# Patient Record
Sex: FEMALE | Race: BLACK OR AFRICAN AMERICAN | Employment: FULL TIME | URBAN - METROPOLITAN AREA
[De-identification: names, ages, dates, MRNs, and addresses within clinical notes are randomized per-mention and may not be internally consistent; named-entity substitution may affect disease eponyms.]

---

## 2020-08-28 PROBLEM — A74.9 CHLAMYDIA INFECTION AFFECTING PREGNANCY, ANTEPARTUM: Status: ACTIVE | Noted: 2020-08-28

## 2020-08-28 PROBLEM — O98.819 CHLAMYDIA INFECTION AFFECTING PREGNANCY, ANTEPARTUM: Status: ACTIVE | Noted: 2020-08-28

## 2020-08-28 PROBLEM — Z34.80 SUPERVISION OF OTHER NORMAL PREGNANCY, ANTEPARTUM: Status: ACTIVE | Noted: 2020-08-28

## 2020-11-04 PROBLEM — O99.013 ANEMIA, ANTEPARTUM, THIRD TRIMESTER: Status: ACTIVE | Noted: 2020-11-04

## 2021-01-02 ENCOUNTER — HOSPITAL ENCOUNTER (INPATIENT)
Age: 26
LOS: 2 days | Discharge: HOME | End: 2021-01-04
Attending: OBSTETRICS & GYNECOLOGY | Admitting: OBSTETRICS & GYNECOLOGY

## 2021-01-02 PROBLEM — O47.9 UTERINE CONTRACTIONS DURING PREGNANCY: Status: ACTIVE | Noted: 2021-01-02

## 2021-01-02 PROBLEM — Z37.9 NORMAL LABOR: Status: ACTIVE | Noted: 2021-01-02

## 2021-01-02 LAB
ABO + RH BLD: NORMAL
ARTERIAL PATENCY WRIST A: ABNORMAL
ARTERIAL PATENCY WRIST A: ABNORMAL
BASE DEFICIT BLD-SCNC: 3 MMOL/L
BASE DEFICIT BLD-SCNC: 3 MMOL/L
BDY SITE: ABNORMAL
BDY SITE: ABNORMAL
BLOOD GROUP ANTIBODIES SERPL: NORMAL
CO2 BLD-SCNC: 25 MMOL/L
CO2 BLD-SCNC: 26 MMOL/L
COLLECT TIME,HTIME: 58
COLLECT TIME,HTIME: 58
ERYTHROCYTE [DISTWIDTH] IN BLOOD BY AUTOMATED COUNT: 13.2 % (ref 11.9–14.6)
GAS FLOW.O2 O2 DELIVERY SYS: ABNORMAL L/MIN
GAS FLOW.O2 O2 DELIVERY SYS: ABNORMAL L/MIN
HCO3 BLD-SCNC: 24.2 MMOL/L (ref 22–26)
HCO3 BLDV-SCNC: 23.2 MMOL/L (ref 23–28)
HCT VFR BLD AUTO: 30.7 % (ref 35.8–46.3)
HGB BLD-MCNC: 9.9 G/DL (ref 11.7–15.4)
MCH RBC QN AUTO: 28.3 PG (ref 26.1–32.9)
MCHC RBC AUTO-ENTMCNC: 32.2 G/DL (ref 31.4–35)
MCV RBC AUTO: 87.7 FL (ref 79.6–97.8)
NRBC # BLD: 0 K/UL (ref 0–0.2)
PCO2 BLDCO: 46 MMHG (ref 32–68)
PCO2 BLDCO: 53 MMHG (ref 32–68)
PH BLDCO: 7.27 [PH] (ref 7.15–7.38)
PH BLDCO: 7.31 [PH] (ref 7.15–7.38)
PLATELET # BLD AUTO: 268 K/UL (ref 150–450)
PMV BLD AUTO: 10.4 FL (ref 9.4–12.3)
PO2 BLDCO: 16 MMHG
PO2 BLDCO: 9 MMHG
RBC # BLD AUTO: 3.5 M/UL (ref 4.05–5.2)
SAO2 % BLD: 7 % (ref 95–98)
SAO2 % BLDV: 18 % (ref 65–88)
SERVICE CMNT-IMP: ABNORMAL
SERVICE CMNT-IMP: ABNORMAL
SPECIMEN EXP DATE BLD: NORMAL
SPECIMEN TYPE: ABNORMAL
SPECIMEN TYPE: ABNORMAL
WBC # BLD AUTO: 15.9 K/UL (ref 4.3–11.1)

## 2021-01-02 PROCEDURE — 85027 COMPLETE CBC AUTOMATED: CPT

## 2021-01-02 PROCEDURE — 2709999900 HC NON-CHARGEABLE SUPPLY

## 2021-01-02 PROCEDURE — 74011250637 HC RX REV CODE- 250/637: Performed by: OBSTETRICS & GYNECOLOGY

## 2021-01-02 PROCEDURE — 75410000000 HC DELIVERY VAGINAL/SINGLE

## 2021-01-02 PROCEDURE — 75410000002 HC LABOR FEE PER 1 HR

## 2021-01-02 PROCEDURE — 82803 BLOOD GASES ANY COMBINATION: CPT

## 2021-01-02 PROCEDURE — 74011250636 HC RX REV CODE- 250/636

## 2021-01-02 PROCEDURE — 99284 EMERGENCY DEPT VISIT MOD MDM: CPT

## 2021-01-02 PROCEDURE — 74011000250 HC RX REV CODE- 250: Performed by: OBSTETRICS & GYNECOLOGY

## 2021-01-02 PROCEDURE — 86901 BLOOD TYPING SEROLOGIC RH(D): CPT

## 2021-01-02 PROCEDURE — 74011250636 HC RX REV CODE- 250/636: Performed by: OBSTETRICS & GYNECOLOGY

## 2021-01-02 PROCEDURE — 75410000003 HC RECOV DEL/VAG/CSECN EA 0.5 HR

## 2021-01-02 PROCEDURE — 0KQM0ZZ REPAIR PERINEUM MUSCLE, OPEN APPROACH: ICD-10-PCS | Performed by: OBSTETRICS & GYNECOLOGY

## 2021-01-02 PROCEDURE — 76815 OB US LIMITED FETUS(S): CPT

## 2021-01-02 PROCEDURE — 65270000029 HC RM PRIVATE

## 2021-01-02 RX ORDER — IBUPROFEN 800 MG/1
800 TABLET ORAL
Status: DISCONTINUED | OUTPATIENT
Start: 2021-01-02 | End: 2021-01-04 | Stop reason: HOSPADM

## 2021-01-02 RX ORDER — OXYCODONE HYDROCHLORIDE 5 MG/1
5 TABLET ORAL
Status: DISCONTINUED | OUTPATIENT
Start: 2021-01-02 | End: 2021-01-04 | Stop reason: HOSPADM

## 2021-01-02 RX ORDER — NALOXONE HYDROCHLORIDE 0.4 MG/ML
0.4 INJECTION, SOLUTION INTRAMUSCULAR; INTRAVENOUS; SUBCUTANEOUS AS NEEDED
Status: DISCONTINUED | OUTPATIENT
Start: 2021-01-02 | End: 2021-01-04 | Stop reason: HOSPADM

## 2021-01-02 RX ORDER — ZOLPIDEM TARTRATE 5 MG/1
5 TABLET ORAL
Status: DISCONTINUED | OUTPATIENT
Start: 2021-01-02 | End: 2021-01-04 | Stop reason: HOSPADM

## 2021-01-02 RX ORDER — LIDOCAINE HYDROCHLORIDE 20 MG/ML
JELLY TOPICAL
Status: DISCONTINUED | OUTPATIENT
Start: 2021-01-02 | End: 2021-01-02 | Stop reason: HOSPADM

## 2021-01-02 RX ORDER — LOPERAMIDE HYDROCHLORIDE 2 MG/1
2 CAPSULE ORAL
Status: DISCONTINUED | OUTPATIENT
Start: 2021-01-02 | End: 2021-01-04 | Stop reason: HOSPADM

## 2021-01-02 RX ORDER — IBUPROFEN 800 MG/1
800 TABLET ORAL
Qty: 60 TAB | Refills: 2 | Status: SHIPPED | OUTPATIENT
Start: 2021-01-02 | End: 2021-02-12

## 2021-01-02 RX ORDER — OXYTOCIN 10 [USP'U]/ML
10 INJECTION, SOLUTION INTRAMUSCULAR; INTRAVENOUS ONCE
Status: COMPLETED | OUTPATIENT
Start: 2021-01-02 | End: 2021-01-02

## 2021-01-02 RX ORDER — SODIUM CHLORIDE 0.9 % (FLUSH) 0.9 %
5-40 SYRINGE (ML) INJECTION EVERY 8 HOURS
Status: DISCONTINUED | OUTPATIENT
Start: 2021-01-02 | End: 2021-01-02 | Stop reason: HOSPADM

## 2021-01-02 RX ORDER — BUTORPHANOL TARTRATE 2 MG/ML
1 INJECTION INTRAMUSCULAR; INTRAVENOUS
Status: DISCONTINUED | OUTPATIENT
Start: 2021-01-02 | End: 2021-01-02 | Stop reason: HOSPADM

## 2021-01-02 RX ORDER — DIPHENHYDRAMINE HCL 25 MG
25 CAPSULE ORAL
Status: DISCONTINUED | OUTPATIENT
Start: 2021-01-02 | End: 2021-01-04 | Stop reason: HOSPADM

## 2021-01-02 RX ORDER — DOCUSATE SODIUM 100 MG/1
100 CAPSULE, LIQUID FILLED ORAL
Status: DISCONTINUED | OUTPATIENT
Start: 2021-01-02 | End: 2021-01-04 | Stop reason: HOSPADM

## 2021-01-02 RX ORDER — SIMETHICONE 80 MG
80 TABLET,CHEWABLE ORAL
Status: DISCONTINUED | OUTPATIENT
Start: 2021-01-02 | End: 2021-01-04 | Stop reason: HOSPADM

## 2021-01-02 RX ORDER — ONDANSETRON 4 MG/1
4 TABLET, ORALLY DISINTEGRATING ORAL
Status: ACTIVE | OUTPATIENT
Start: 2021-01-02 | End: 2021-01-03

## 2021-01-02 RX ORDER — OXYTOCIN/RINGER'S LACTATE 30/500 ML
87.3 PLASTIC BAG, INJECTION (ML) INTRAVENOUS AS NEEDED
Status: DISCONTINUED | OUTPATIENT
Start: 2021-01-02 | End: 2021-01-04 | Stop reason: HOSPADM

## 2021-01-02 RX ORDER — OXYTOCIN/RINGER'S LACTATE 30/500 ML
10 PLASTIC BAG, INJECTION (ML) INTRAVENOUS AS NEEDED
Status: DISCONTINUED | OUTPATIENT
Start: 2021-01-02 | End: 2021-01-04 | Stop reason: HOSPADM

## 2021-01-02 RX ORDER — LIDOCAINE HYDROCHLORIDE 10 MG/ML
1 INJECTION INFILTRATION; PERINEURAL
Status: COMPLETED | OUTPATIENT
Start: 2021-01-02 | End: 2021-01-02

## 2021-01-02 RX ORDER — OXYTOCIN/RINGER'S LACTATE 30/500 ML
87.3 PLASTIC BAG, INJECTION (ML) INTRAVENOUS AS NEEDED
Status: COMPLETED | OUTPATIENT
Start: 2021-01-02 | End: 2021-01-02

## 2021-01-02 RX ORDER — MINERAL OIL
120 OIL (ML) ORAL
Status: DISCONTINUED | OUTPATIENT
Start: 2021-01-02 | End: 2021-01-02 | Stop reason: HOSPADM

## 2021-01-02 RX ORDER — SODIUM CHLORIDE 0.9 % (FLUSH) 0.9 %
5-40 SYRINGE (ML) INJECTION AS NEEDED
Status: DISCONTINUED | OUTPATIENT
Start: 2021-01-02 | End: 2021-01-02 | Stop reason: HOSPADM

## 2021-01-02 RX ORDER — OXYCODONE HYDROCHLORIDE 5 MG/1
10 TABLET ORAL
Status: DISCONTINUED | OUTPATIENT
Start: 2021-01-02 | End: 2021-01-04 | Stop reason: HOSPADM

## 2021-01-02 RX ORDER — DEXTROSE, SODIUM CHLORIDE, SODIUM LACTATE, POTASSIUM CHLORIDE, AND CALCIUM CHLORIDE 5; .6; .31; .03; .02 G/100ML; G/100ML; G/100ML; G/100ML; G/100ML
125 INJECTION, SOLUTION INTRAVENOUS CONTINUOUS
Status: DISCONTINUED | OUTPATIENT
Start: 2021-01-02 | End: 2021-01-02 | Stop reason: HOSPADM

## 2021-01-02 RX ORDER — OXYTOCIN/RINGER'S LACTATE 30/500 ML
10 PLASTIC BAG, INJECTION (ML) INTRAVENOUS AS NEEDED
Status: COMPLETED | OUTPATIENT
Start: 2021-01-02 | End: 2021-01-02

## 2021-01-02 RX ORDER — OXYTOCIN 10 [USP'U]/ML
INJECTION, SOLUTION INTRAMUSCULAR; INTRAVENOUS
Status: COMPLETED
Start: 2021-01-02 | End: 2021-01-02

## 2021-01-02 RX ADMIN — IBUPROFEN 800 MG: 800 TABLET ORAL at 06:09

## 2021-01-02 RX ADMIN — LIDOCAINE HYDROCHLORIDE 0.1 ML: 10 INJECTION, SOLUTION INFILTRATION; PERINEURAL at 01:31

## 2021-01-02 RX ADMIN — SODIUM CHLORIDE, SODIUM LACTATE, POTASSIUM CHLORIDE, AND CALCIUM CHLORIDE 1000 ML: 600; 310; 30; 20 INJECTION, SOLUTION INTRAVENOUS at 01:20

## 2021-01-02 RX ADMIN — OXYTOCIN 10 UNITS: 10 INJECTION INTRAVENOUS at 01:05

## 2021-01-02 RX ADMIN — OXYTOCIN 10 UNITS: 10 INJECTION, SOLUTION INTRAMUSCULAR; INTRAVENOUS at 01:05

## 2021-01-02 RX ADMIN — Medication 1 SPRAY: at 06:09

## 2021-01-02 RX ADMIN — IBUPROFEN 800 MG: 800 TABLET ORAL at 23:28

## 2021-01-02 RX ADMIN — WITCH HAZEL 1 PAD: 500 SOLUTION RECTAL; TOPICAL at 06:09

## 2021-01-02 RX ADMIN — Medication 10000 MILLI-UNITS: at 01:50

## 2021-01-02 RX ADMIN — Medication 87.3 MILLI-UNITS/MIN: at 02:10

## 2021-01-02 RX ADMIN — DOCUSATE SODIUM 100 MG: 100 CAPSULE ORAL at 16:47

## 2021-01-02 RX ADMIN — IBUPROFEN 800 MG: 800 TABLET ORAL at 16:47

## 2021-01-02 NOTE — PROGRESS NOTES
Admission assessment complete as noted. Patient oriented to room and unit. Plan of care reviewed and patient verbalizes understanding. Patient up to bathroom with RN assistance. Patient voided 400mL of clear, yellow urine. Nusrat-care taught and completed. Questions encouraged and answered. Patent encouraged to call for needs or concerns. Safety Teaching reviewed:   1. Hand hygiene prior to handling the infant. 2. Use of bulb syringe. 3. Bracelets with matching numbers are placed on mother and infant  4. An infant security tag  [facility]) is placed on the infant's ankle and monitored  5. All OB nurses wear pink Employee badges - do not give your baby to anyone without proper identification. 6. Never leave the baby alone in the room. 7. The infant should be placed on their back to sleep. on a firm mattress. No toys should be placed in the crib. (safe sleep video offered to view)  8. Never shake the baby (video offered to view)  9. Infant fall prevention - do not sleep with the baby, and place the baby in the crib while ambulating. 8. Mother and Baby Care booklet given to Mother.

## 2021-01-02 NOTE — PROGRESS NOTES
Results for Justine Holiday" (MRN [id number]) as of 1/2/2021 01:25   Ref.  Range 1/2/2021 01:17 1/2/2021 01:21   pCO2 cord blood Latest Ref Range: 32 - 68 mmHg 53 46   pO2 cord blood Latest Units: mmHg 9 16   HCO3 (POC) Latest Ref Range: 22 - 26 MMOL/L 24.2    sO2 (POC) Latest Ref Range: 95 - 98 % 7 (L)    Base deficit (POC) Latest Units: mmol/L 3 3   Specimen type (POC) Latest Units:   ARTERIAL CORD VENOUS CORD   pH, cord blood (POC) Latest Ref Range: 7.15 - 7.38   7.27 7.31   HCO3, venous (POC) Latest Ref Range: 23 - 28 MMOL/L  23.2   sO2, venous (POC) Latest Ref Range: 65 - 88 %  18 (L)   Site Latest Units:   CORD CORD   Device: Latest Units:   ROOM AIR ROOM AIR   Allens test (POC) Latest Units:   NOT APPLICABLE NOT APPLICABLE

## 2021-01-02 NOTE — L&D DELIVERY NOTE
Patient progressed rapidly to delivery before iv access was able to be established. Delivery in 3 pushes of vfi over 2nd degree lac with vaginal extension. Terminal meconium with odor to amniotic fluid. Infant vigorous at birth. Dried, suctioned, then cord clamped and cut and taken to warmer attended by nicu. Placenta delivered spont/intact. Cervix intact. Minimal bleeding. Dr. Peter Eldridge in room. Repaired laceration in usual fashion. Mom and baby stable. Ebl 300 and apgars 8/9    I was immediately available for this delivery, scrubbed and performed the repair. Uterus was firm to bimanual massage and IM pitocin.   -Vasyl Gomez MD FACOG    Delivery Summary    Patient: Mando Dick MRN: [id number]  SSN: [id number]    YOB: 1995  Age: 31 y.o.   Sex: female       Information for the patient's :  Jo Ann Serrano [[id number]]       Labor Events:    Labor: No    Steroids: None   Cervical Ripening Date/Time:       Cervical Ripening Type: None   Antibiotics During Labor: No   Rupture Identifier:      Rupture Date/Time:       Rupture Type: SROM   Amniotic Fluid Volume:      Amniotic Fluid Description: Unable to determine    Amniotic Fluid Odor: Foul    Induction: None       Induction Date/Time:        Indications for Induction:      Augmentation: None   Augmentation Date/Time:      Indications for Augmentation:     Labor complications: None       Additional complications:        Delivery Events:  Indications For Episiotomy:     Episiotomy: None   Perineal Laceration(s): 2nd   Repaired: Yes   Periurethral Laceration Location: right    Repaired: No    Labial Laceration Location:     Repaired:     Sulcal Laceration Location:     Repaired:     Vaginal Laceration Location:     Repaired:     Cervical Laceration Location:     Repaired:     Repair Suture: Vicryl 3-0;Vicryl 2-0   Number of Repair Packets: 2   Estimated Blood Loss (ml): 300ml   Quantitative Blood Loss (ml) Delivery Date: 2021    Delivery Time: 12:58 AM  Delivery Type: Vaginal, Spontaneous  Sex:  Female    Gestational Age: 36w4d   Delivery Clinician:  Rimma Angel  Living Status: Living   Delivery Location: L&D OB ED/triage room           APGARS  One minute Five minutes Ten minutes   Skin color:            Heart rate:            Grimace:            Muscle tone:            Breathing: Totals:                Presentation: Vertex    Position:        Resuscitation Method:  Tactile Stimulation     Meconium Stained: Terminal      Cord Information: 3 Vessels  Complications: None  Cord around:    Delayed cord clamping? Yes  Cord clamped date/time:   Disposition of Cord Blood: Lab    Blood Gases Sent?: Yes    Placenta:  Date/Time: 2021  1:03 AM  Removal: Spontaneous      Appearance: Normal;Intact      Measurements:  Birth Weight:        Birth Length:        Head Circumference:        Chest Circumference:       Abdominal Girth: Other Providers:   Tami LEE;JESSIE CORADO;;LORI NESBITT;;ALESSIA WISEMAN;JUN CARREON;JERMANIE CHRISTINE, Obstetrician;Primary Nurse;Primary [place] Nurse;Scrub Tech;Neonatologist;Respiratory Therapist;In-House Ob; Charge Nurse           Group B Strep: No results found for: ДМИТРИЙ White  Information for the patient's :  Neelam Ca [[id number]]   No results found for: ABORH, PCTABR, PCTDIG, BILI, ABORHEXT, ABORH     Recent Labs     21  0121 21  0117   PCO2CB 46 53   PO2CB 16 9   HCO3I  --  24.2   SO2I  --  7*   IBD 3 3   SPECTI VENOUS CORD ARTERIAL CORD   PHICB 7.31 7.27   ISITE CORD CORD   IDEV ROOM AIR ROOM AIR   IALLEN NOT APPLICABLE NOT APPLICABLE

## 2021-01-02 NOTE — ROUTINE PROCESS
report received from [facility] [place], RN. Patient care assumed.

## 2021-01-02 NOTE — PROGRESS NOTES
Patient presents to OBED1 via EMS with c/o contractions every 3 minutes. Denies leaking of fluid, vaginal bleeding, or urge to push. EFM initiated and VS checked.

## 2021-01-02 NOTE — H&P
History & Physical    Name: Reva Flores MRN: [id number]  SSN: [id number]    YOB: 1995  Age: 31 y.o. Sex: female      Chief Complaint   Patient presents with    Contractions       Subjective:     Estimated Date of Delivery: 21  OB History    Para Term  AB Living   2       1     SAB TAB Ectopic Molar Multiple Live Births   1                # Outcome Date GA Lbr Gary/2nd Weight Sex Delivery Anes PTL Lv   2 Current            1 2019 12w0d             Birth Comments: D&C       Ms. Neredia Irene is seen with pregnancy at 38w2d for active labor. Patient states she has been priscilla since 12 noon with leaking of fluid. Prenatal course was complicated by chlamydia. the patients states that the baby moves as usual   Please see prenatal records for details. Past Medical History:   Diagnosis Date    Anemia, antepartum, third trimester 2020    Chlamydia infection affecting pregnancy, antepartum 2020     Past Surgical History:   Procedure Laterality Date    HX DILATION AND CURETTAGE       Social History     Occupational History    Occupation: [employer]   Tobacco Use    Smoking status: Never Smoker    Smokeless tobacco: Never Used   Substance and Sexual Activity    Alcohol use: Not Currently     Frequency: Never    Drug use: Never    Sexual activity: Yes     Partners: Male     Birth control/protection: None     Family History   Problem Relation Age of Onset    Diabetes Maternal Grandmother     Hypertension Maternal Grandmother        Allergies   Allergen Reactions    Pcn [Penicillins] Unknown (comments)     Was told she was as a child     Prior to Admission medications    Medication Sig Start Date End Date Taking? Authorizing Provider   metroNIDAZOLE (FlagyL) 500 mg tablet Take 4 pills tonight orally then take one pill po bid for 7 days 20   Lyndon Dyer MD   PNV No.40-Iron Fum-FA Cmb No.1 27-1 mg tab Take  by mouth.     Provider, Historical Review of Systems:  Constitutional:No headache, fever  Cardiac:   No chest pain      Resp: No cough or shortness of breath     GI:   No nausea/vomiting, diarrhea, abdominal pain    :   No dysuria  Neuro:     No vision changes, headache      Objective:     Vitals:  Vitals:    21 0019   BP: 134/76   Pulse: (!) 108   Resp: 20   Temp: 99.4 °F (37.4 °C)        Physical Exam:  Patient without distress. Heart: Regular rate and rhythm  Lung: clear to auscultation throughout lung fields, no wheezes, no rales, no rhonchi and normal respiratory effort  Back: costovertebral angle tenderness absent  Abdomen: soft, nontender, without guarding, without rebound  Fundus: soft and non tender  Cervical Exam: 9 cm dilated    100% effaced    0 station  Bloody show  Presenting Part: cephalic, hair palpable  Lower Extremities:  - Edema 1+  Membranes:  Spontaneous Rupture of Membranes; Amniotic Fluid: unsure fluid  Fetal Heart Rate tracing: baseline variable but only tracing for a short time. Possible low baseline with accels or late decelerations  Uterine contractions: regular, every 2 minutes    Prenatal Labs:   Lab Results   Component Value Date/Time    Rubella, External Immune 2020    HBsAg, External Negative 2020    HIV, External Negative 2020    RPR, External Negative 2020    Gonorrhea, External Negative 2020    Chlamydia, External positive 2020         Assessment/Plan:     Ms. Yara Pelayo is a  seen with pregnancy at 38w2d for active labor. Cat 2 tracing. Plan:     Admit for labor management, fluid resuscitation, labs, o2 and positioning. Monitor response of tracing closely.     Patient discussed with Dr. Luis Arrieta By:  Fortino Campbell MD     2021

## 2021-01-02 NOTE — PROGRESS NOTES
SBAR OUT Report: Mother    Verbal report given to Vivian Patel RN (full name & credentials) on this patient, who is now being transferred to MIU (unit) for routine progression of care. The patient is not wearing a green \"Anesthesia-Duramorph\" band. Report consisted of patient's Situation, Background, Assessment and Recommendations (SBAR).  ID bands were compared with the identification form, and verified with the patient and receiving nurse. Information from the SBAR, Intake/Output and MAR and the Sigrid Report was reviewed with the receiving nurse; opportunity for questions and clarification provided.     Fundal assessment performed with oncoming RN

## 2021-01-02 NOTE — PROGRESS NOTES
SBAR IN Report: Mother    Verbal report received from Dae Moran RN on this patient, who is now being transferred from L&D for routine progression of care. The patient is not wearing a green \"Anesthesia-Duramorph\" band. Report consisted of patient's Situation, Background, Assessment and Recommendations (SBAR). [place] ID bands were compared with the identification form, and verified with the patient and transferring nurse. Information from the SBAR, Kardex, Procedure Summary, Intake/Output, MAR and Recent Results and the Sigrid Report was reviewed with the transferring nurse; opportunity for questions and clarification provided.

## 2021-01-03 PROCEDURE — 2709999900 HC NON-CHARGEABLE SUPPLY

## 2021-01-03 PROCEDURE — 65270000029 HC RM PRIVATE

## 2021-01-03 PROCEDURE — 74011250637 HC RX REV CODE- 250/637: Performed by: OBSTETRICS & GYNECOLOGY

## 2021-01-03 RX ADMIN — IBUPROFEN 800 MG: 800 TABLET ORAL at 05:35

## 2021-01-03 RX ADMIN — IBUPROFEN 800 MG: 800 TABLET ORAL at 12:53

## 2021-01-03 RX ADMIN — DOCUSATE SODIUM 100 MG: 100 CAPSULE ORAL at 12:53

## 2021-01-03 RX ADMIN — IBUPROFEN 800 MG: 800 TABLET ORAL at 23:40

## 2021-01-03 RX ADMIN — IBUPROFEN 800 MG: 800 TABLET ORAL at 18:16

## 2021-01-03 RX ADMIN — DOCUSATE SODIUM 100 MG: 100 CAPSULE ORAL at 18:16

## 2021-01-03 NOTE — PROGRESS NOTES
Post-Partum Day Number 0 Progress Note    Patient doing well post-partum without significant complaint. Voiding without difficulty, normal lochia. Denies pain. Vitals:    Patient Vitals for the past 8 hrs:   BP Temp Pulse Resp SpO2   21 (!) 101/55 97.8 °F (36.6 °C) 83 18 96 %     Temp (24hrs), Av.4 °F (36.9 °C), Min:97.8 °F (36.6 °C), Max:99.4 °F (37.4 °C)      Vital signs stable, afebrile. Exam:  Patient without distress. Abdomen soft, fundus firm at level of umbilicus, nontender               Perineum with normal lochia noted. Lower extremities are negative for swelling, cords or tenderness. Lab/Data Review:  CBC:   Lab Results   Component Value Date/Time    WBC 15.9 (H) 2021 05:27 AM    HGB 9.9 (L) 2021 05:27 AM    HCT 30.7 (L) 2021 05:27 AM     2021 05:27 AM       Assessment and Plan:  Patient appears to be having uncomplicated post-partum course. Continue routine perineal care and maternal education. Plan discharge tomorrow if no problems occur - pt delivered precipitously this morning but confirmed if baby is approved for discharge tomorrow, she would prefer to go as well. Reviewed with her that we can always hold the order if issues arise.

## 2021-01-03 NOTE — PROGRESS NOTES
Post-Partum Day Number 1 Progress Note    Patient doing well post-partum without significant complaint. Voiding without difficulty, normal lochia. Vitals:    Patient Vitals for the past 8 hrs:   BP Temp Pulse Resp SpO2   21 1543 98/61 98.3 °F (36.8 °C) 78 17 97 %     Temp (24hrs), Av.9 °F (36.6 °C), Min:97.7 °F (36.5 °C), Max:98.3 °F (36.8 °C)      Vital signs stable, afebrile. Exam:  Patient without distress. Abdomen soft, fundus firm at level of umbilicus, nontender               Lower extremities are negative for swelling, cords or tenderness. Lab/Data Review: All lab results for the last 24 hours reviewed. Assessment and Plan:  Patient appears to be having uncomplicated post-partum course. Continue routine perineal care and maternal education. Desires to stay tonight, thus plan discharge tomorrow if no problems occur.

## 2021-01-04 VITALS
TEMPERATURE: 97.5 F | SYSTOLIC BLOOD PRESSURE: 101 MMHG | DIASTOLIC BLOOD PRESSURE: 55 MMHG | OXYGEN SATURATION: 97 % | HEART RATE: 66 BPM | RESPIRATION RATE: 18 BRPM

## 2021-01-04 PROCEDURE — 2709999900 HC NON-CHARGEABLE SUPPLY

## 2021-01-04 PROCEDURE — 74011250637 HC RX REV CODE- 250/637: Performed by: OBSTETRICS & GYNECOLOGY

## 2021-01-04 RX ADMIN — IBUPROFEN 800 MG: 800 TABLET ORAL at 06:07

## 2021-01-04 NOTE — PROGRESS NOTES
Chart reviewed - first time parent. SW met with patient while social distancing w/mask     provided education on [facility] Postpartum  Home Visit. At this time, [facility] is completing this  home visit telephonically due to social distancing. Family would like to participate in program.  Referral will be made at discharge. Patient given informational packet on  mood & anxiety disorders (resources/education). No PCP - referral made to [facility] PCP Coordinator. Patient confirms that insurance is [employer] (copy of card scanned into chart on 10/01/20). Family denies any additional needs from  at this time. Family has 's contact information should any needs/questions arise.     NAKUL Keith-DOYLE  [facility]

## 2021-01-04 NOTE — PROGRESS NOTES
Patient is S/P vaginal delivery at 38 2/7 weeks, labor. No complaints today. Lochia < menses. No GI/ issues. No F/C. VITALS  Patient Vitals for the past 24 hrs:   Temp Pulse Resp BP SpO2   21 0718 97.5 °F (36.4 °C) 66 18 (!) 101/55 97 %   21 1847 98.3 °F (36.8 °C) 75 16 106/70 98 %   21 1543 98.3 °F (36.8 °C) 78 17 98/61 97 %        CV - RRR  LUNGS - CTA bilaterally  ABD - soft, approp tend, FF below umbilicus  EXT - tr edema bilaterally          Labs:  No results found for this or any previous visit (from the past 24 hour(s)). PPD #2      Pt is bottle feeding. No issues or complaints today  Stable.   Routine PP instructions      Sarah Damon MD  9:03 AM  21

## 2021-01-04 NOTE — PROGRESS NOTES
Referral made to [facility]  home visit program.    Taj Potter

## 2021-01-04 NOTE — PROGRESS NOTES
Problem: Patient Education: Go to Patient Education Activity  Goal: Patient/Family Education  Outcome: Resolved/Met     Problem: Vaginal Delivery: Postpartum 2  Goal: Off Pathway (Use only if patient is Off Pathway)  Outcome: Resolved/Met  Goal: Activity/Safety  Outcome: Resolved/Met  Goal: Consults, if ordered  Outcome: Resolved/Met  Goal: Nutrition/Diet  Outcome: Resolved/Met  Goal: Discharge Planning  Outcome: Resolved/Met  Goal: Medications  Outcome: Resolved/Met  Goal: Treatments/Interventions/Procedures  Outcome: Resolved/Met  Goal: Psychosocial  Outcome: Resolved/Met     Problem: Vaginal Delivery: Discharge Outcomes  Goal: *Verbalizes name, dosage, time, side effects, and number of days to continue medications  Outcome: Resolved/Met  Goal: *Describes available resources and support systems  Outcome: Resolved/Met  Goal: *No signs and symptoms of infection  Outcome: Resolved/Met  Goal: *Birth certificate information completed  Outcome: Resolved/Met  Goal: *Received and verbalizes understanding of discharge plan and instructions  Outcome: Resolved/Met  Goal: *Vital signs within defined limits  Outcome: Resolved/Met  Goal: *Labs within defined limits  Outcome: Resolved/Met  Goal: *Hemodynamically stable  Outcome: Resolved/Met  Goal: *Optimal pain control at patient's stated goal  Outcome: Resolved/Met  Goal: *Participates in infant care  Outcome: Resolved/Met  Goal: *Demonstrates progressive activity  Outcome: Resolved/Met  Goal: *Appropriate parent-infant bonding  Outcome: Resolved/Met  Goal: *Tolerating diet  Outcome: Resolved/Met     Problem: Falls - Risk of  Goal: *Absence of Falls  Description: Document Mindy Fall Risk and appropriate interventions in the flowsheet.   Outcome: Resolved/Met  Note: Fall Risk Interventions:            Medication Interventions: Teach patient to arise slowly                   Problem: Patient Education: Go to Patient Education Activity  Goal: Patient/Family Education  Outcome: Resolved/Met

## 2021-01-04 NOTE — DISCHARGE INSTRUCTIONS
Patient Education        After Your Delivery (the Postpartum Period): Care Instructions  Your Care Instructions     Congratulations on the birth of your baby. Like pregnancy, the  period can be a time of excitement, pascual, and exhaustion. You may look at your wondrous little baby and feel happy. You may also be overwhelmed by your new sleep hours and new responsibilities. At first, babies often sleep during the days and are awake at night. They do not have a pattern or routine. They may make sudden gasps, jerk themselves awake, or look like they have crossed eyes. These are all normal, and they may even make you smile. In these first weeks after delivery, try to take good care of yourself. It may take 4 to 6 weeks to feel like yourself again, and possibly longer if you had a  birth. You will likely feel very tired for several weeks. Your days will be full of ups and downs, but lots of pascual as well. Follow-up care is a key part of your treatment and safety. Be sure to make and go to all appointments, and call your doctor if you are having problems. It's also a good idea to know your test results and keep a list of the medicines you take. How can you care for yourself at home? Take care of your body after delivery  · Use pads instead of tampons for the bloody flow that may last as long as 2 weeks. · Ease cramps with ibuprofen (Advil, Motrin). · Ease soreness of hemorrhoids and the area between your vagina and rectum with ice compresses or witch hazel pads. · Ease constipation by drinking lots of fluid and eating high-fiber foods. Ask your doctor about over-the-counter stool softeners. · Cleanse yourself with a gentle squeeze of warm water from a bottle instead of wiping with toilet paper. · Take a sitz bath in warm water several times a day. · Wear a good nursing bra. Ease sore and swollen breasts with warm, wet washcloths.   · If you are not breastfeeding, use ice rather than heat for breast soreness. · Your period may not start for several months if you are breastfeeding. You may bleed more, and longer at first, than you did before you got pregnant. · Wait until you are healed (about 4 to 6 weeks) before you have sexual intercourse. Your doctor will tell you when it is okay to have sex. · Try not to travel with your baby for 5 or 6 weeks. If you take a long car trip, make frequent stops to walk around and stretch. Avoid exhaustion  · Rest every day. Try to nap when your baby naps. · Ask another adult to be with you for a few days after delivery. · Plan for  if you have other children. · Stay flexible so you can eat at odd hours and sleep when you need to. Both you and your baby are making new schedules. · Plan small trips to get out of the house. Change can make you feel less tired. · Ask for help with housework, cooking, and shopping. Remind yourself that your job is to care for your baby. Know about help for postpartum depression  · \"Baby blues\" are common for the first 1 to 2 weeks after birth. You may cry or feel sad or irritable for no reason. · Rest whenever you can. Being tired makes it harder to handle your emotions. · Go for walks with your baby. · Talk to your partner, friends, and family about your feelings. · If your symptoms last for more than a few weeks, or if you feel very depressed, ask your doctor for help. · Postpartum depression can be treated. Support groups and counseling can help. Sometimes medicine can also help. Stay healthy  · Eat healthy foods so you have more energy and lose extra baby pounds. · If you breastfeed, avoid drugs. If you quit smoking during pregnancy, try to stay smoke-free. If you choose to have a drink now and then, have only one drink, and limit the number of occasions that you have a drink. Wait to breastfeed at least 2 hours after you have a drink to reduce the amount of alcohol the baby may get in the milk.   · Start daily exercise after 4 to 6 weeks, but rest when you feel tired. · Learn exercises to tone your belly. Do Kegel exercises to regain strength in your pelvic muscles. You can do these exercises while you stand or sit. ? Squeeze the same muscles you would use to stop your urine. Your belly and thighs should not move. ? Hold the squeeze for 3 seconds, and then relax for 3 seconds. ? Start with 3 seconds. Then add 1 second each week until you are able to squeeze for 10 seconds. ? Repeat the exercise 10 to 15 times for each session. Do three or more sessions each day. · Find a class for new mothers and new babies that has an exercise time. · If you had a  birth, give yourself a bit more time before you exercise, and be careful. When should you call for help? Call  911 anytime you think you may need emergency care. For example, call if:    · You have thoughts of harming yourself, your baby, or another person.     · You passed out (lost consciousness).     · You have chest pain, are short of breath, or cough up blood.     · You have a seizure. Call your doctor now or seek immediate medical care if:    · You have severe vaginal bleeding. This means you are passing blood clots and soaking through a pad each hour for 2 or more hours.     · You are dizzy or lightheaded, or you feel like you may faint.     · You have a fever.     · You have new or more belly pain.     · You have signs of a blood clot in your leg (called a deep vein thrombosis), such as:  ? Pain in the calf, back of the knee, thigh, or groin. ? Redness and swelling in your leg or groin.     · You have signs of preeclampsia, such as:  ? Sudden swelling of your face, hands, or feet. ? New vision problems (such as dimness, blurring, or seeing spots). ? A severe headache.    Watch closely for changes in your health, and be sure to contact your doctor if:    · Your vaginal bleeding seems to be getting heavier.     · You have new or worse vaginal discharge.     · You feel sad, anxious, or hopeless for more than a few days.     · You do not get better as expected. Where can you learn more? Go to http://www.[employer].com/  Enter A461 in the search box to learn more about \"After Your Delivery (the Postpartum Period): Care Instructions. \"  Current as of: February 11, 2020               Content Version: 12.6  © 2006-2020 [employer]. Care instructions adapted under license by [employer] (which disclaims liability or warranty for this information). If you have questions about a medical condition or this instruction, always ask your healthcare professional. [place] [address] any warranty or liability for your use of this information. DISCHARGE SUMMARY from Nurse    PATIENT INSTRUCTIONS:    After general anesthesia or intravenous sedation, for 24 hours or while taking prescription Narcotics:  · Limit your activities  · Do not drive and operate hazardous machinery  · Do not make important personal or business decisions  · Do  not drink alcoholic beverages  · If you have not urinated within 8 hours after discharge, please contact your surgeon on call. Report the following to your surgeon:  · Excessive pain, swelling, redness or odor of or around the surgical area  · Temperature over 100.5  · Nausea and vomiting lasting longer than 4 hours or if unable to take medications  · Any signs of decreased circulation or nerve impairment to extremity: change in color, persistent  numbness, tingling, coldness or increase pain  · Any questions    What to do at Home:    Nothing in the vagina for 6 weeks. Call MD if experiencing heavy vaginal bleeding greater than 1 pad per hour, temperature over 100.4, foul smelling vaginal discharge, thoughts of suicide or homicide, symptoms of postpartum depression or mastitis, or any other major medical concerns.               *  Please give a list of your current medications to your Primary Care Provider. *  Please update this list whenever your medications are discontinued, doses are      changed, or new medications (including over-the-counter products) are added. *  Please carry medication information at all times in case of emergency situations. These are general instructions for a healthy lifestyle:    No smoking/ No tobacco products/ Avoid exposure to second hand smoke  Surgeon General's Warning:  Quitting smoking now greatly reduces serious risk to your health. Obesity, smoking, and sedentary lifestyle greatly increases your risk for illness    A healthy diet, regular physical exercise & weight monitoring are important for maintaining a healthy lifestyle    You may be retaining fluid if you have a history of heart failure or if you experience any of the following symptoms:  Weight gain of 3 pounds or more overnight or 5 pounds in a week, increased swelling in our hands or feet or shortness of breath while lying flat in bed. Please call your doctor as soon as you notice any of these symptoms; do not wait until your next office visit. The discharge information has been reviewed with the patient. The patient verbalized understanding. Discharge medications reviewed with the patient and appropriate educational materials and side effects teaching were provided.   ___________________________________________________________________________________________________________________________________

## 2021-01-07 NOTE — DISCHARGE SUMMARY
Obstetrical Discharge Summary     Name: Evan Kemp MRN: [id number]  SSN: [id number]    YOB: 1995  Age: 31 y.o. Sex: female      Allergies: Pcn [penicillins]    Admit Date: 2021    Discharge Date: 2021     Admitting Physician: Silverio Alves MD     Attending Physician:  No att. providers found     * Admission Diagnoses: Uterine contractions during pregnancy [O62.2]    * Discharge Diagnoses:   Information for the patient's :  Padmini Nguyen [[id number]]   Delivery of a 7 lb 7.8 oz (3.395 kg) female infant via Vaginal, Spontaneous on 2021 at 12:58 AM  by Joel Javier. Apgars were 8  and 9 . Additional Diagnoses:   Hospital Problems as of 2021 Date Reviewed: 2021          Codes Class Noted - Resolved POA    Uterine contractions during pregnancy ICD-10-CM: O62.2  ICD-9-CM: 661.20  2021 - Present Unknown        * (Principal) Normal labor ICD-10-CM: O80, Z37.9  ICD-9-CM: 110  2021 - Present Yes             Lab Results   Component Value Date/Time    ABO/Rh(D) A POSITIVE 2021 05:27 AM    Rubella, External Immune 2020    ABO,Rh A positive 2020    There is no immunization history for the selected administration types on file for this patient.     * Procedures:   * No surgery found *      Retired Use Flowsheet (6) Muse  Depression Scale  I have been able to laugh and see the funny side of things: As much as I always could  I have looked forward with enjoyment to things: As much as I ever did  I have blamed myself unnecessarily when things went wrong: Not very often  I have been anxious or worried for no good reason: Yes, sometimes  I have felt scared or panicky for no very good reason: No, not at all  Things have been getting on top of me: No, I have been coping as well as ever  I have been so unhappy that I have had difficulty sleeping: No, not at all  I have felt sad or miserable: No, not at all  I have been so unhappy that I have been crying: No, never  The thought of harming myself has occurred to me: Never  Total Score: 3    * Discharge Condition: good    * Hospital Course: Normal hospital course following the delivery. * Disposition: Home    Discharge Medications:   Discharge Medication List as of 1/4/2021  9:35 AM      START taking these medications    Details   ibuprofen (MOTRIN) 800 mg tablet Take 1 Tab by mouth every six (6) hours as needed for Pain., Normal, Disp-60 Tab, R-2         CONTINUE these medications which have NOT CHANGED    Details   PNV No.40-Iron Fum-FA Cmb No.1 27-1 mg tab Take  by mouth., Historical Med         STOP taking these medications       metroNIDAZOLE (FlagyL) 500 mg tablet Comments:   Reason for Stopping:               * Follow-up Care/Patient Instructions: Activity: Activity as tolerated, No sex for 6 weeks, No driving while on narcotic analgesics   Diet: Regular Diet  Wound Care: None needed    Follow-up Information     Follow up With Specialties Details Why Contact Info    None    None ([phone]) Patient stated that they have no PCP      Chiki Alvarenga MD Obstetrics & Gynecology, Gynecology, Obstetrics In 6 weeks call office to schedule an appointment to be seen in 10 weeks [facility] [address] [phone]                        .

## 2022-03-18 PROBLEM — Z37.9 NORMAL LABOR: Status: ACTIVE | Noted: 2021-01-02

## 2022-03-19 PROBLEM — O47.9 UTERINE CONTRACTIONS DURING PREGNANCY: Status: ACTIVE | Noted: 2021-01-02

## 2022-03-19 PROBLEM — O98.819 CHLAMYDIA INFECTION AFFECTING PREGNANCY, ANTEPARTUM: Status: ACTIVE | Noted: 2020-08-28

## 2022-03-19 PROBLEM — Z34.80 SUPERVISION OF OTHER NORMAL PREGNANCY, ANTEPARTUM: Status: ACTIVE | Noted: 2020-08-28

## 2022-03-19 PROBLEM — O99.013 ANEMIA, ANTEPARTUM, THIRD TRIMESTER: Status: ACTIVE | Noted: 2020-11-04

## 2022-03-19 PROBLEM — A74.9 CHLAMYDIA INFECTION AFFECTING PREGNANCY, ANTEPARTUM: Status: ACTIVE | Noted: 2020-08-28

## 2023-04-25 ENCOUNTER — OFFICE VISIT (OUTPATIENT)
Dept: OBGYN CLINIC | Age: 28
End: 2023-04-25

## 2023-04-25 VITALS
DIASTOLIC BLOOD PRESSURE: 86 MMHG | BODY MASS INDEX: 28.52 KG/M2 | WEIGHT: 171.2 LBS | HEIGHT: 65 IN | SYSTOLIC BLOOD PRESSURE: 120 MMHG

## 2023-04-25 DIAGNOSIS — Z12.4 SCREENING FOR CERVICAL CANCER: ICD-10-CM

## 2023-04-25 DIAGNOSIS — Z30.09 FAMILY PLANNING: ICD-10-CM

## 2023-04-25 DIAGNOSIS — N92.6 IRREGULAR MENSES: ICD-10-CM

## 2023-04-25 DIAGNOSIS — Z11.3 SCREEN FOR STD (SEXUALLY TRANSMITTED DISEASE): ICD-10-CM

## 2023-04-25 DIAGNOSIS — Z01.419 WELL WOMAN EXAM: Primary | ICD-10-CM

## 2023-04-25 PROBLEM — Z37.9 NORMAL LABOR: Status: RESOLVED | Noted: 2021-01-02 | Resolved: 2023-04-25

## 2023-04-25 PROBLEM — Z34.80 SUPERVISION OF OTHER NORMAL PREGNANCY, ANTEPARTUM: Status: RESOLVED | Noted: 2020-08-28 | Resolved: 2023-04-25

## 2023-04-25 PROBLEM — O99.013 ANEMIA, ANTEPARTUM, THIRD TRIMESTER: Status: RESOLVED | Noted: 2020-11-04 | Resolved: 2023-04-25

## 2023-04-25 PROBLEM — O47.9 UTERINE CONTRACTIONS DURING PREGNANCY: Status: RESOLVED | Noted: 2021-01-02 | Resolved: 2023-04-25

## 2023-04-25 PROBLEM — A74.9 CHLAMYDIA INFECTION AFFECTING PREGNANCY, ANTEPARTUM: Status: RESOLVED | Noted: 2020-08-28 | Resolved: 2023-04-25

## 2023-04-25 PROBLEM — O98.819 CHLAMYDIA INFECTION AFFECTING PREGNANCY, ANTEPARTUM: Status: RESOLVED | Noted: 2020-08-28 | Resolved: 2023-04-25

## 2023-04-25 LAB
HCG, PREGNANCY, URINE, POC: NEGATIVE
VALID INTERNAL CONTROL, POC: NORMAL

## 2023-04-25 PROCEDURE — 81025 URINE PREGNANCY TEST: CPT | Performed by: OBSTETRICS & GYNECOLOGY

## 2023-04-25 PROCEDURE — 99395 PREV VISIT EST AGE 18-39: CPT | Performed by: OBSTETRICS & GYNECOLOGY

## 2023-04-25 RX ORDER — LEVONORGESTREL AND ETHINYL ESTRADIOL 0.1-0.02MG
1 KIT ORAL DAILY
Qty: 3 PACKET | Refills: 4 | Status: SHIPPED | OUTPATIENT
Start: 2023-04-25

## 2023-04-25 NOTE — PROGRESS NOTES
2023    Benny Polo  1995      PCP: NOT ON FILE, MD  Patient does not see them for regular preventative visits. HPI: 28y.o. year old  Here for annual gyn wellness exam.   Prior [facility] pt. Last seen 2yrs ago for a PP visit. BC = condoms. Would like std screen    Patient's last menstrual period was 2023 (approximate). Social History     Socioeconomic History    Marital status: Single     Spouse name: None    Number of children: None    Years of education: None    Highest education level: None   Tobacco Use    Smoking status: Never    Smokeless tobacco: Never   Substance and Sexual Activity    Alcohol use: Not Currently    Drug use: Never   Social History Narrative    GYN:  History of chlamydia     Last pap - ?2019. No hx abnl paps  Lipids- declines  Gardasil - has not had      Hx GDM: no      Allergies, medications, past medical and surgical history, social history, family history all reviewed.        Review of Systems      Constitutional:  Denies unexplained weight loss/gain or heat/ cold intolerance/loss of balance  ENT: Denies blurred vision, loss of hearing, hoarseness  Cardiovascular:  Denies chest pain, swelling in legs or feet, shortness of breath when lying flat  Respiratory:  Denies shortness of breath, cough greater than 2 weeks or coughing up blood  Gastro: Denies diarrhea greater than 2 weeks, rectal bleeding, bloody stools, heartburn, or constipation  :  Denies blood in urine, nocturia, dysuria or incontinence  Breast:  Denies nipple discharge, masses or pain  Skin:  Denies rash greater than 2 weeks, change in moles  Musculoskeletal/Neuro:  Denies joint pain, muscle weakness, seizures, headaches  Psych:  Denies new onset depression, anxiety, panic attacks  Heme:  Denies easy bruising, bleeding gums, frequent nosebleeds or swollen lymph nodes  GYN:  Denies bleeding or spotting between menses, heavy menses, menses longer than 7 days, pain with sex, severe menstrual

## 2023-05-04 LAB
C TRACH RRNA CVX QL NAA+PROBE: POSITIVE
CYTOLOGIST CVX/VAG CYTO: ABNORMAL
CYTOLOGY CVX/VAG DOC THIN PREP: ABNORMAL
HPV REFLEX: ABNORMAL
Lab: ABNORMAL
N GONORRHOEA RRNA CVX QL NAA+PROBE: NEGATIVE
PATH REPORT.FINAL DX SPEC: ABNORMAL
PATHOLOGIST CVX/VAG CYTO: ABNORMAL
PATHOLOGIST PROVIDED ICD: ABNORMAL
STAT OF ADQ CVX/VAG CYTO-IMP: ABNORMAL
T VAGINALIS RRNA SPEC QL NAA+PROBE: NEGATIVE

## 2023-05-10 ENCOUNTER — TELEPHONE (OUTPATIENT)
Dept: OBGYN CLINIC | Age: 28
End: 2023-05-10

## 2023-05-10 RX ORDER — AZITHROMYCIN 500 MG/1
1000 TABLET, FILM COATED ORAL ONCE
Qty: 2 TABLET | Refills: 0 | Status: SHIPPED | OUTPATIENT
Start: 2023-05-10 | End: 2023-05-10

## 2023-05-10 RX ORDER — METRONIDAZOLE 500 MG/1
500 TABLET ORAL 2 TIMES DAILY
Qty: 14 TABLET | Refills: 0 | Status: SHIPPED | OUTPATIENT
Start: 2023-05-10 | End: 2023-05-17

## 2023-05-10 NOTE — TELEPHONE ENCOUNTER
Flagyl and azithromycin sent to pharmacy.     ----- Message from Angie Khan MD sent at 5/9/2023  1:51 PM EDT -----  Please let pt know results and record in chart as needed. Pap itself is NL. But needs tx for bv and chlamydia. Flagyl 500mg bid for BV (this is Not an STD)  Needs zithromax 1gm for + CT  Partner(s) need treatment from their physician- NOT testing. Actual treatment.   Nobody does anything with anybody until all parties have been treated and 5-7d have gone by  Pt needs DALLAS in 4wks  She may also want to come for bloodwork- hiv, rpr, hepBsAg, hep C Ab's

## 2024-01-18 ENCOUNTER — OFFICE VISIT (OUTPATIENT)
Dept: OBGYN CLINIC | Age: 29
End: 2024-01-18

## 2024-01-18 VITALS
WEIGHT: 169 LBS | DIASTOLIC BLOOD PRESSURE: 62 MMHG | BODY MASS INDEX: 28.16 KG/M2 | HEIGHT: 65 IN | SYSTOLIC BLOOD PRESSURE: 104 MMHG

## 2024-01-18 DIAGNOSIS — N89.8 VAGINAL DISCHARGE: ICD-10-CM

## 2024-01-18 DIAGNOSIS — A74.9 CHLAMYDIA: ICD-10-CM

## 2024-01-18 DIAGNOSIS — N93.0 BLEEDING AFTER INTERCOURSE: Primary | ICD-10-CM

## 2024-01-18 PROCEDURE — 99214 OFFICE O/P EST MOD 30 MIN: CPT | Performed by: OBSTETRICS & GYNECOLOGY

## 2024-01-18 NOTE — PROGRESS NOTES
treat appropriately after results are back.  This may resolve the PCB  But if it does not, needs colpo.     Michelle Nielsen MD

## 2024-01-20 LAB
A VAGINAE DNA VAG QL NAA+PROBE: ABNORMAL SCORE
BVAB2 DNA VAG QL NAA+PROBE: ABNORMAL SCORE
C ALBICANS DNA VAG QL NAA+PROBE: NEGATIVE
C GLABRATA DNA VAG QL NAA+PROBE: NEGATIVE
MEGA1 DNA VAG QL NAA+PROBE: ABNORMAL SCORE
SPECIMEN SOURCE: ABNORMAL

## 2024-01-22 ENCOUNTER — TELEPHONE (OUTPATIENT)
Dept: OBGYN CLINIC | Age: 29
End: 2024-01-22

## 2024-01-22 DIAGNOSIS — N76.0 BACTERIAL VAGINOSIS: Primary | ICD-10-CM

## 2024-01-22 DIAGNOSIS — B96.89 BACTERIAL VAGINOSIS: Primary | ICD-10-CM

## 2024-01-22 RX ORDER — METRONIDAZOLE 500 MG/1
500 TABLET ORAL 2 TIMES DAILY
Qty: 14 TABLET | Refills: 0 | Status: SHIPPED | OUTPATIENT
Start: 2024-01-22 | End: 2024-01-29

## 2024-01-25 ENCOUNTER — TELEPHONE (OUTPATIENT)
Dept: OBGYN CLINIC | Age: 29
End: 2024-01-25

## 2024-01-25 DIAGNOSIS — A74.9 CHLAMYDIA: Primary | ICD-10-CM

## 2024-01-25 LAB
A VAGINAE DNA VAG QL NAA+PROBE: ABNORMAL SCORE
BVAB2 DNA VAG QL NAA+PROBE: ABNORMAL SCORE
C ALBICANS DNA VAG QL NAA+PROBE: NEGATIVE
C GLABRATA DNA VAG QL NAA+PROBE: NEGATIVE
C TRACH RRNA SPEC QL NAA+PROBE: POSITIVE
MEGA1 DNA VAG QL NAA+PROBE: ABNORMAL SCORE
N GONORRHOEA RRNA SPEC QL NAA+PROBE: NEGATIVE
SPECIMEN SOURCE: ABNORMAL
T VAGINALIS RRNA SPEC QL NAA+PROBE: NEGATIVE

## 2024-01-25 NOTE — TELEPHONE ENCOUNTER
----- Message from Michelle Nielsen MD sent at 1/25/2024  1:14 PM EST -----  Needs zithromax 1gm for + CT  Partner(s) need treatment from their physician  Nobody does anything with anybody until all parties have been treated and 5-7d have gone by  Pt needs DALLAS in 4wks

## 2024-01-26 RX ORDER — AZITHROMYCIN 500 MG/1
1000 TABLET, FILM COATED ORAL ONCE
Qty: 2 TABLET | Refills: 0 | Status: SHIPPED | OUTPATIENT
Start: 2024-01-26 | End: 2024-01-26

## 2024-04-01 NOTE — PROGRESS NOTES
2024      Antwon Sweeney  : 1995  28 y.o.      HPI: DALLAS for chlamydia dx 1-18. Pt reports no trouble taking her med. Partner(s) have been treated.  No d/c or concerns.    Exam:  /64   Ht 1.651 m (5' 5\")   Wt 75.9 kg (167 lb 6.4 oz)   BMI 27.86 kg/m²     Body mass index is 27.86 kg/m².    Pt in no distress. Alert and oriented x3. Affect bright.  Well developed, well nourished.  HEENT: normocephalic, atraumatic. Sclerae nonicteric.  Pelvic: normal external genitalia, urethral meatus, urethra, vagina and cervix. Cx does not appear inflamed. No abnl d/c noted.  Perineum and anus normal. No hemorrhoids.   Neuro: grossly intact    Antwon was seen today for follow-up.    Diagnoses and all orders for this visit:    Chlamydia  -     C.trachomatis N.gonorrhoeae DNA; Future    Screening for STDs (sexually transmitted diseases)  -     C.trachomatis N.gonorrhoeae DNA; Future     Will notify pt of results.   LV I had mentioned probiotic to her to help avoid BV. She could not remember the name. It is Align.     Michelle Nielsen MD

## 2024-04-02 ENCOUNTER — OFFICE VISIT (OUTPATIENT)
Dept: OBGYN CLINIC | Age: 29
End: 2024-04-02
Payer: MEDICAID

## 2024-04-02 VITALS
SYSTOLIC BLOOD PRESSURE: 110 MMHG | HEIGHT: 65 IN | BODY MASS INDEX: 27.89 KG/M2 | WEIGHT: 167.4 LBS | DIASTOLIC BLOOD PRESSURE: 64 MMHG

## 2024-04-02 DIAGNOSIS — A74.9 CHLAMYDIA: Primary | ICD-10-CM

## 2024-04-02 DIAGNOSIS — Z11.3 SCREENING FOR STDS (SEXUALLY TRANSMITTED DISEASES): ICD-10-CM

## 2024-04-02 PROCEDURE — 99212 OFFICE O/P EST SF 10 MIN: CPT | Performed by: OBSTETRICS & GYNECOLOGY

## 2024-04-04 LAB
C TRACH RRNA SPEC QL NAA+PROBE: POSITIVE
N GONORRHOEA RRNA SPEC QL NAA+PROBE: NEGATIVE
SPECIMEN SOURCE: ABNORMAL

## 2024-04-08 ENCOUNTER — TELEPHONE (OUTPATIENT)
Dept: OBGYN CLINIC | Age: 29
End: 2024-04-08

## 2024-04-08 DIAGNOSIS — A74.9 CHLAMYDIA: Primary | ICD-10-CM

## 2024-04-08 RX ORDER — AZITHROMYCIN 500 MG/1
1000 TABLET, FILM COATED ORAL ONCE
Qty: 2 TABLET | Refills: 0 | Status: SHIPPED | OUTPATIENT
Start: 2024-04-08 | End: 2024-04-08

## 2024-04-08 NOTE — TELEPHONE ENCOUNTER
Called pt and informed her of results. Pt voiced understanding. Rx sent in and DALLAS scheduled      ----- Message from Michelle Nielsen MD sent at 4/6/2024 11:30 PM EDT -----  Needs zithromax 1gm for + CT  Partner(s) need treatment from their physician- NOT TESTING. ACTUAL TREATMENT.  NOBODY DOES ANYTHING WITH ANYBODY until all parties have been treated and 5-7d have gone by. NO ORAL SEX, NO SHARING TOYS  Pt needs DALLAS in 4wks